# Patient Record
Sex: MALE | Race: ASIAN | Employment: STUDENT | URBAN - METROPOLITAN AREA
[De-identification: names, ages, dates, MRNs, and addresses within clinical notes are randomized per-mention and may not be internally consistent; named-entity substitution may affect disease eponyms.]

---

## 2018-08-17 ENCOUNTER — OFFICE VISIT (OUTPATIENT)
Dept: OCCUPATIONAL MEDICINE | Age: 23
End: 2018-08-17
Attending: PHYSICIAN ASSISTANT

## 2018-08-20 ENCOUNTER — OFFICE VISIT (OUTPATIENT)
Dept: OCCUPATIONAL MEDICINE | Age: 23
End: 2018-08-20
Attending: PHYSICIAN ASSISTANT

## 2018-08-24 ENCOUNTER — OFFICE VISIT (OUTPATIENT)
Dept: OCCUPATIONAL MEDICINE | Age: 23
End: 2018-08-24
Attending: PHYSICIAN ASSISTANT

## 2018-08-27 ENCOUNTER — OFFICE VISIT (OUTPATIENT)
Dept: OCCUPATIONAL MEDICINE | Age: 23
End: 2018-08-27
Attending: PHYSICIAN ASSISTANT

## 2021-08-04 NOTE — LETTER
Called patient and advised of below message from device RN and scheduling.    From Routing comments:     Your patient was recently seen at the [facility] for a hospital follow-up visit. The visit note is attached. Please contact the clinic with any questions at [phone].     Thank you,  FRANKLYN Castro

## 2022-01-05 ENCOUNTER — APPOINTMENT (OUTPATIENT)
Dept: CT IMAGING | Age: 27
DRG: 391 | End: 2022-01-05
Attending: PHYSICIAN ASSISTANT
Payer: MEDICAID

## 2022-01-05 ENCOUNTER — HOSPITAL ENCOUNTER (INPATIENT)
Facility: HOSPITAL | Age: 27
LOS: 3 days | Discharge: HOME | DRG: 391 | End: 2022-01-08
Attending: STUDENT IN AN ORGANIZED HEALTH CARE EDUCATION/TRAINING PROGRAM | Admitting: SURGERY
Payer: MEDICAID

## 2022-01-05 ENCOUNTER — HOSPITAL ENCOUNTER (OUTPATIENT)
Age: 27
Discharge: ANOTHER HOSPITAL | DRG: 391 | End: 2022-01-05
Payer: MEDICAID

## 2022-01-05 VITALS
BODY MASS INDEX: 22.53 KG/M2 | RESPIRATION RATE: 18 BRPM | DIASTOLIC BLOOD PRESSURE: 89 MMHG | WEIGHT: 132 LBS | TEMPERATURE: 99 F | HEART RATE: 103 BPM | HEIGHT: 64 IN | SYSTOLIC BLOOD PRESSURE: 136 MMHG | OXYGEN SATURATION: 99 %

## 2022-01-05 DIAGNOSIS — R10.32 ABDOMINAL PAIN, LEFT LOWER QUADRANT: ICD-10-CM

## 2022-01-05 DIAGNOSIS — K65.1 INTRA-ABDOMINAL ABSCESS (HCC): Primary | ICD-10-CM

## 2022-01-05 DIAGNOSIS — K57.20 DIVERTICULITIS OF LARGE INTESTINE WITH ABSCESS WITHOUT BLEEDING: Primary | ICD-10-CM

## 2022-01-05 LAB
#MXD IC: 0.9 X10ˆ3/UL (ref 0.1–1)
BUN BLD-MCNC: 14 MG/DL (ref 7–18)
CHLORIDE BLD-SCNC: 101 MMOL/L (ref 98–112)
CO2 BLD-SCNC: 31 MMOL/L (ref 21–32)
CREAT BLD-MCNC: 0.8 MG/DL
GLUCOSE BLD-MCNC: 99 MG/DL (ref 70–99)
HCT VFR BLD AUTO: 44.8 %
HCT VFR BLD CALC: 46 %
HGB BLD-MCNC: 15.4 G/DL
ISTAT IONIZED CALCIUM FOR CHEM 8: 1.24 MMOL/L (ref 1.12–1.32)
LYMPHOCYTES # BLD AUTO: 1.3 X10ˆ3/UL (ref 1–4)
LYMPHOCYTES NFR BLD AUTO: 11.2 %
MCH RBC QN AUTO: 30 PG (ref 26–34)
MCHC RBC AUTO-ENTMCNC: 34.4 G/DL (ref 31–37)
MCV RBC AUTO: 87.2 FL (ref 80–100)
MIXED CELL %: 7.5 %
NEUTROPHILS # BLD AUTO: 9.2 X10ˆ3/UL (ref 1.5–7.7)
NEUTROPHILS NFR BLD AUTO: 81.3 %
PLATELET # BLD AUTO: 300 X10ˆ3/UL (ref 150–450)
POCT BILIRUBIN URINE: NEGATIVE
POCT BLOOD URINE: NEGATIVE
POCT GLUCOSE URINE: NEGATIVE MG/DL
POCT KETONE URINE: NEGATIVE MG/DL
POCT LEUKOCYTE ESTERASE URINE: NEGATIVE
POCT NITRITE URINE: NEGATIVE
POCT PH URINE: 7 (ref 5–8)
POCT PROTEIN URINE: NEGATIVE MG/DL
POCT SPECIFIC GRAVITY URINE: 1.01
POCT URINE CLARITY: CLEAR
POCT URINE COLOR: YELLOW
POCT UROBILINOGEN URINE: 0.2 MG/DL
POTASSIUM BLD-SCNC: 4.5 MMOL/L (ref 3.6–5.1)
RBC # BLD AUTO: 5.14 X10ˆ6/UL
SODIUM BLD-SCNC: 138 MMOL/L (ref 136–145)
WBC # BLD AUTO: 11.4 X10ˆ3/UL (ref 4–11)

## 2022-01-05 PROCEDURE — 74177 CT ABD & PELVIS W/CONTRAST: CPT | Performed by: PHYSICIAN ASSISTANT

## 2022-01-05 PROCEDURE — 85025 COMPLETE CBC W/AUTO DIFF WBC: CPT | Performed by: PHYSICIAN ASSISTANT

## 2022-01-05 PROCEDURE — 99205 OFFICE O/P NEW HI 60 MIN: CPT

## 2022-01-05 PROCEDURE — 99222 1ST HOSP IP/OBS MODERATE 55: CPT | Performed by: SURGERY

## 2022-01-05 PROCEDURE — 81002 URINALYSIS NONAUTO W/O SCOPE: CPT | Performed by: PHYSICIAN ASSISTANT

## 2022-01-05 PROCEDURE — 80047 BASIC METABLC PNL IONIZED CA: CPT

## 2022-01-05 PROCEDURE — 96361 HYDRATE IV INFUSION ADD-ON: CPT

## 2022-01-05 RX ORDER — ACETAMINOPHEN 325 MG/1
650 TABLET ORAL EVERY 4 HOURS PRN
Status: DISCONTINUED | OUTPATIENT
Start: 2022-01-05 | End: 2022-01-08

## 2022-01-05 RX ORDER — HYDROCODONE BITARTRATE AND ACETAMINOPHEN 5; 325 MG/1; MG/1
1 TABLET ORAL EVERY 4 HOURS PRN
Status: DISCONTINUED | OUTPATIENT
Start: 2022-01-05 | End: 2022-01-08

## 2022-01-05 RX ORDER — HYDROMORPHONE HYDROCHLORIDE 1 MG/ML
0.2 INJECTION, SOLUTION INTRAMUSCULAR; INTRAVENOUS; SUBCUTANEOUS EVERY 2 HOUR PRN
Status: DISCONTINUED | OUTPATIENT
Start: 2022-01-05 | End: 2022-01-08

## 2022-01-05 RX ORDER — ENOXAPARIN SODIUM 100 MG/ML
40 INJECTION SUBCUTANEOUS EVERY 24 HOURS
Status: DISCONTINUED | OUTPATIENT
Start: 2022-01-05 | End: 2022-01-08

## 2022-01-05 RX ORDER — PROCHLORPERAZINE EDISYLATE 5 MG/ML
5 INJECTION INTRAMUSCULAR; INTRAVENOUS EVERY 8 HOURS PRN
Status: DISCONTINUED | OUTPATIENT
Start: 2022-01-05 | End: 2022-01-08

## 2022-01-05 RX ORDER — HYDROMORPHONE HYDROCHLORIDE 1 MG/ML
0.8 INJECTION, SOLUTION INTRAMUSCULAR; INTRAVENOUS; SUBCUTANEOUS EVERY 2 HOUR PRN
Status: DISCONTINUED | OUTPATIENT
Start: 2022-01-05 | End: 2022-01-08

## 2022-01-05 RX ORDER — ONDANSETRON 2 MG/ML
4 INJECTION INTRAMUSCULAR; INTRAVENOUS EVERY 6 HOURS PRN
Status: DISCONTINUED | OUTPATIENT
Start: 2022-01-05 | End: 2022-01-08

## 2022-01-05 RX ORDER — HYDROCODONE BITARTRATE AND ACETAMINOPHEN 5; 325 MG/1; MG/1
2 TABLET ORAL EVERY 4 HOURS PRN
Status: DISCONTINUED | OUTPATIENT
Start: 2022-01-05 | End: 2022-01-08

## 2022-01-05 RX ORDER — SODIUM CHLORIDE 9 MG/ML
1000 INJECTION, SOLUTION INTRAVENOUS ONCE
Status: COMPLETED | OUTPATIENT
Start: 2022-01-05 | End: 2022-01-05

## 2022-01-05 RX ORDER — KETOROLAC TROMETHAMINE 30 MG/ML
15 INJECTION, SOLUTION INTRAMUSCULAR; INTRAVENOUS ONCE
Status: COMPLETED | OUTPATIENT
Start: 2022-01-05 | End: 2022-01-05

## 2022-01-05 RX ORDER — HYDROMORPHONE HYDROCHLORIDE 1 MG/ML
0.4 INJECTION, SOLUTION INTRAMUSCULAR; INTRAVENOUS; SUBCUTANEOUS EVERY 2 HOUR PRN
Status: DISCONTINUED | OUTPATIENT
Start: 2022-01-05 | End: 2022-01-08

## 2022-01-05 RX ORDER — SODIUM CHLORIDE 9 MG/ML
INJECTION, SOLUTION INTRAVENOUS CONTINUOUS
Status: DISCONTINUED | OUTPATIENT
Start: 2022-01-05 | End: 2022-01-06

## 2022-01-05 NOTE — CONSULTS
[facility]  Report of  Surgical Consultation with History and Physical Exam    David Seats Patient Status:  Emergency    2/15/1995 MRN [id number]   Location [address] [facility] Attending Kristin Roldan MD   Hosp Day # 0 PCP Xin Albright medical conditions. His past surgical history is significant for testicular detorsion at age 21. He denies taking any blood thinning medications. The patient denies any family history of colon or rectal cancer.     The patient denies any history of toba weight 132 lb (59.9 kg), SpO2 97 %. General: Alert, orientated x3. Cooperative. No apparent distress. HEENT: Exam is unremarkable. Without scleral icterus. Mucous membranes are moist. EOM are WNL. Neck: No tenderness to palpitation.   Full rang collection is present centered on the wall of the distal descending colon in the left lower quadrant.  This is most compatible with an intramural abscess which is most likely related to diverticulitis.  Follow-up imaging/colonoscopy   is suggested to exclu

## 2022-01-05 NOTE — ED INITIAL ASSESSMENT (HPI)
Patient states he has had LLQ abdominal pain on and off since 12/27/21. Patient states he feels like he is constipated, last BM was last night after drinking prune juice.  Patient states the stool was all liquid and last solid stool was 2 days ago but very

## 2022-01-05 NOTE — ED PROVIDER NOTES
Patient Seen in: [facility] Emergency Department      History   Patient presents with:  Abdomen/Flank Pain    Stated Complaint: diverticulitis from IC    Subjective:   HPI    29yo M presents with abdominal pain.   Since 12/27 he has had fluctuating i normal.   Cardiovascular:      Rate and Rhythm: Normal rate and regular rhythm. Pulmonary:      Effort: Pulmonary effort is normal.      Breath sounds: Normal breath sounds. Abdominal:      Palpations: Abdomen is soft. Tenderness:  There is abdomin with abscess without bleeding K57.20 1/5/2022 Unknown

## 2022-01-05 NOTE — H&P
[facility]  Report of  Surgical Consultation with History and Physical Exam    Rip Castellon Patient Status:  Emergency    2/15/1995 MRN [id number]   Location 656 [facility] Attending Kaitlynn Hair MD   Hosp Day # 0 PCP Jimenez Albright intramural abscess. The patient denies any chronic medical conditions. His past surgical history is significant for testicular detorsion at age 21. He denies taking any blood thinning medications.     The patient denies any family history of colon or re °C), temperature source Oral, resp. rate 18, height 64\", weight 132 lb (59.9 kg), SpO2 97 %. General: Alert, orientated x3. Cooperative. No apparent distress. HEENT: Exam is unremarkable. Without scleral icterus.   Mucous membranes are moist. EOM normal-appearing appendix.            Impression   CONCLUSION:  A fluid collection is present centered on the wall of the distal descending colon in the left lower quadrant.  This is most compatible with an intramural abscess which is most likely related t physical examination, assessment and plan. I have I have edited the above to reflect my evaluation, opinion, and physical exam findings. Patient relates a history of Covid infection just after Clint.   He states he started having left-sided abdomi

## 2022-01-05 NOTE — ED PROVIDER NOTES
Patient Seen in: [facility]      History   Patient presents with:  Abdominal Pain    Stated Complaint: pain lower left side      Subjective:   HPI    61-year-old male here with complaint of left lower quadrant pain that is been intermitte He is well-developed. HENT:      Head: Normocephalic.       Right Ear: External ear normal.      Left Ear: External ear normal.      Nose: Nose normal.      Mouth/Throat:      Mouth: Mucous membranes are moist.   Eyes:      Extraocular Movements: Extraocu scanning was performed from the dome of the diaphragm to the pubic symphysis with non-ionic intravenous contrast material. Post contrast coronal MPR imaging was performed. Dose reduction techniques were used.  Dose information is transmitted to the ACR (Am colon in the left lower quadrant. This is most compatible with an intramural abscess which is most likely related to diverticulitis. Follow-up imaging/colonoscopy is suggested to exclude an underlying neoplastic process.     NOTE: patient is refusing ambu

## 2022-01-05 NOTE — ED QUICK NOTES
Orders for admission, patient is aware of plan and ready to go upstairs. Any questions, please call ED RN Kris Brown  at extension [phone]. Vaccinated?  yes  Type of COVID test sent: NA  COVID Suspicion level: Low (positive on 25 of december)      Titratable

## 2022-01-05 NOTE — ED INITIAL ASSESSMENT (HPI)
Sent to the ED after having CT that showed possible intramural abscess. Patient having LLQ abdominal pain since 12/27. Worse with movement. No fevers.  No n/v. covid + 12/25

## 2022-01-05 NOTE — ED QUICK NOTES
IV site wrapped for patient's drive to THE [facility]

## 2022-01-06 LAB
ANION GAP SERPL CALC-SCNC: 9 MMOL/L (ref 0–18)
BASOPHILS # BLD AUTO: 0.02 X10(3) UL (ref 0–0.2)
BASOPHILS NFR BLD AUTO: 0.2 %
BUN BLD-MCNC: 14 MG/DL (ref 7–18)
CALCIUM BLD-MCNC: 9.1 MG/DL (ref 8.5–10.1)
CHLORIDE SERPL-SCNC: 107 MMOL/L (ref 98–112)
CO2 SERPL-SCNC: 22 MMOL/L (ref 21–32)
CREAT BLD-MCNC: 0.75 MG/DL
EOSINOPHIL # BLD AUTO: 0.01 X10(3) UL (ref 0–0.7)
EOSINOPHIL NFR BLD AUTO: 0.1 %
ERYTHROCYTE [DISTWIDTH] IN BLOOD BY AUTOMATED COUNT: 10.7 %
GLUCOSE BLD-MCNC: 67 MG/DL (ref 70–99)
GLUCOSE BLD-MCNC: 72 MG/DL (ref 70–99)
GLUCOSE BLD-MCNC: 75 MG/DL (ref 70–99)
HCT VFR BLD AUTO: 40.3 %
HGB BLD-MCNC: 13.4 G/DL
IMM GRANULOCYTES # BLD AUTO: 0.06 X10(3) UL (ref 0–1)
IMM GRANULOCYTES NFR BLD: 0.5 %
LYMPHOCYTES # BLD AUTO: 1.05 X10(3) UL (ref 1–4)
LYMPHOCYTES NFR BLD AUTO: 9.3 %
MCH RBC QN AUTO: 28.9 PG (ref 26–34)
MCHC RBC AUTO-ENTMCNC: 33.3 G/DL (ref 31–37)
MCV RBC AUTO: 87 FL
MONOCYTES # BLD AUTO: 0.92 X10(3) UL (ref 0.1–1)
MONOCYTES NFR BLD AUTO: 8.2 %
NEUTROPHILS # BLD AUTO: 9.22 X10 (3) UL (ref 1.5–7.7)
NEUTROPHILS # BLD AUTO: 9.22 X10(3) UL (ref 1.5–7.7)
NEUTROPHILS NFR BLD AUTO: 81.7 %
OSMOLALITY SERPL CALC.SUM OF ELEC: 285 MOSM/KG (ref 275–295)
PLATELET # BLD AUTO: 279 10(3)UL (ref 150–450)
POTASSIUM SERPL-SCNC: 3.9 MMOL/L (ref 3.5–5.1)
RBC # BLD AUTO: 4.63 X10(6)UL
SODIUM SERPL-SCNC: 138 MMOL/L (ref 136–145)
WBC # BLD AUTO: 11.3 X10(3) UL (ref 4–11)

## 2022-01-06 PROCEDURE — 99232 SBSQ HOSP IP/OBS MODERATE 35: CPT | Performed by: SURGERY

## 2022-01-06 NOTE — PROGRESS NOTES
Mynor Webber who is covering for Dr.Mason womack d/t BG recheck being 76. Jduie Dodd notified that patient is currently NPO and has 0.9 NC running at 100 mL/hr.  MD ordered CLD for patient, to stop fluids if patient is tolerating diet and advance to FLD at that t

## 2022-01-06 NOTE — PROGRESS NOTES
[facility]  Progress Note    David Staley Patient Status:  Inpatient    2/15/1995 MRN [id number]   [facility] 3NE-A Attending Reagan Madera, DO   Hosp Day # 1 PCP Thai Madera MD     Subjective:  Patient seen and examined state large intestine with abscess without bleeding      Impression: Transmural colonic abscess clinically improving today. Continue IV antibiotics. Will get GI evaluation regarding possible consideration for inflammatory bowel disease.   CT scan does not show

## 2022-01-06 NOTE — PLAN OF CARE
NURSING ADMISSION NOTE      Patient admitted via Cart  Oriented to room. Safety precautions initiated. Bed in low position. Call light in reach.       Patient alert and oriented x4   No current c/o pain or SOB  Non tele, no edema present, peripheral puls

## 2022-01-06 NOTE — PLAN OF CARE
Pt alert/oriented x4 resting in bed. Vitals reviewed. Pt denies pain and SOB on RA sating at 97%. Pt NPO except for ice chips. Pt on IV ABT for diverticulitis abscess. Pt offers no complaints at this time.  Safety precautions in place, will continue to [facility]

## 2022-01-06 NOTE — PLAN OF CARE
Patient alert and oriented x4  No current c/o pain or SOB, abdominal guarding, previously c/o LLQ pain  No tele monitoring, no edema present, peripheral pulses palpable   Glucose checked this morning d/t serum glucose of 67 at 0519, glucometer BG 72 at [facility] facility with appropriate resources  Description: INTERVENTIONS:  - Identify barriers to discharge w/pt and caregiver  - Include patient/family/discharge partner in discharge planning  - Arrange for needed discharge resources and transportation as appropri

## 2022-01-06 NOTE — PAYOR COMM NOTE
--------------  ADMISSION REVIEW     Payor: [employer] #:  [id number]  Authorization Number: [id number]    Admit date: 1/5/22  Admit time:  6:42 PM       REVIEW DOCUMENTATION:        LLQ abdominal pain       SURGE torsion                Social History    Tobacco Use      Smoking status: Never Smoker      Smokeless tobacco: Never Used    Vaping Use      Vaping Use: Every day    Alcohol use: Yes    Drug use: Never             Review of Systems    Positive for stated c intramural abscess which is most likely related to diverticulitis. Follow-up imaging/colonoscopy is suggested to exclude an underlying neoplastic process.    Dictated by (CST): Ramez Velez MD on 1/05/2022 at 11:27 AM     Finalized by (CST): Ramez Velez MD on room physician for abdominal pain. History of Present Illness:  Dallas Castro is a a(n) 27year old male with a recent history of COVID-19 who presented to [facility] immediate care with complaints of 9 days of abdominal pain.     The patient reports juan history of alcohol abuse. History:  Past Medical History:   Diagnosis Date   • Testicular torsion 2012     Past Surgical History:   Procedure Laterality Date   • OTHER SURGICAL HISTORY  2012    testicular torsion     No family history on file.    repo lateral flexion of cervical spine. No JVD. Supple. Lungs: Clear to auscultation bilaterally. No wheezes, no rales, no rhonchi. Good excursion of the diaphragms. No secondary use of accessory respiratory musculature.     Cardiac: Regular rhythm, tachyca (CST):  Gabby Pizarro MD on 1/05/2022 at 11:27 AM       Finalized by (CST): Gabby Pizarro MD on 1/05/2022 at 11:34 AM         Impression:  Patient Active Problem List:     Diverticulitis of large intestine with abscess without bleeding    Naomy Villela is a 26-yea appetite has been poor he states his bowel movements have been irregular. He states he occasionally has a small bowel movement however he also will go days without a bowel movement. He states he does pass stool gas regularly.   He denies a history of joaquim (37.1 °C) — — — — — — — JM    01/06/22 0421 99 °F (37.2 °C) 78 18 121/63 97 % — — — IG    01/05/22 1813 98.2 °F (36.8 °C) — — — — — None (Room air) — JW    01/05/22 1813 — 97 14 153/80 99 % — — — AM    01/05/22 1800 — 98 13 115/56 99 % — None (Room air) —

## 2022-01-06 NOTE — CONSULTS
[facility]                       Gastroenterology Consultation-[facility]    Anibal Longoria Patient Status:  Inpatient    2/15/1995 MRN [id number]   [facility] 3NE-A Attending Keisha January, [address] Day Intravenous, Q6H PRN  prochlorperazine (COMPAZINE) injection 5 mg, 5 mg, Intravenous, Q8H PRN  Piperacillin Sod-Tazobactam So (ZOSYN) 3.375 g in dextrose 5% 100 mL IVPB-MBP, 3.375 g, Intravenous, Q8H  0.9% NaCl infusion, , Intravenous, Continuous      Ace lb (59.9 kg)   SpO2 97%   BMI 22.66 kg/m²   Gen: AAO x 3, able to speak in complete sentences  HENT: NCAT, EOMI, PERRL, oropharynx is clear with moist mucosal membranes  Eyes: Sclerae are anicteric  Neck:  Supple without nuchal rigidity;  No lymphadenopathy presumed abscess within wall of descending colon: could be secondary to colitis/diverticulitis in the surrounding area  2. Recent constipation with previously normal BMs  3. Recent COVID    Recommendations:   1. Continue Abx  2.  Surgery also following; sta

## 2022-01-07 LAB
ADENOVIRUS F 40/41 PCR: NEGATIVE
ASTROVIRUS PCR: NEGATIVE
C CAYETANENSIS DNA SPEC QL NAA+PROBE: NEGATIVE
C DIFF TOX B STL QL: NEGATIVE
CAMPY SP DNA.DIARRHEA STL QL NAA+PROBE: NEGATIVE
CRYPTOSP DNA SPEC QL NAA+PROBE: NEGATIVE
EAEC PAA PLAS AGGR+AATA ST NAA+NON-PRB: NEGATIVE
EC STX1+STX2 + H7 FLIC SPEC NAA+PROBE: NEGATIVE
ENTAMOEBA HISTOLYTICA PCR: NEGATIVE
EPEC EAE GENE STL QL NAA+NON-PROBE: NEGATIVE
ETEC LTA+ST1A+ST1B TOX ST NAA+NON-PROBE: NEGATIVE
GIARDIA LAMBLIA PCR: NEGATIVE
NOROVIRUS GI/GII PCR: NEGATIVE
P SHIGELLOIDES DNA STL QL NAA+PROBE: NEGATIVE
ROTAVIRUS A PCR: NEGATIVE
SALMONELLA DNA SPEC QL NAA+PROBE: NEGATIVE
SAPOVIRUS PCR: NEGATIVE
SHIGELLA SP+EIEC IPAH ST NAA+NON-PROBE: NEGATIVE
V CHOLERAE DNA SPEC QL NAA+PROBE: NEGATIVE
VIBRIO DNA SPEC NAA+PROBE: NEGATIVE
YERSINIA DNA SPEC NAA+PROBE: NEGATIVE

## 2022-01-07 PROCEDURE — 99232 SBSQ HOSP IP/OBS MODERATE 35: CPT | Performed by: SURGERY

## 2022-01-07 NOTE — PLAN OF CARE
Assumed care at [address] [facility]. Pt is A&O x4. On Ra, . No orders for tele. Denies any abdominal pain and n/v. Currently on full liquid diet. IV abx zosyn. Had 1 loose BM overnight, sent stool sample down to rule out c.diff per protocol.  On contact plus precaution

## 2022-01-07 NOTE — PROGRESS NOTES
Gastroenterology Progress Note  Patient Name: Julio Mello  Chief Complaint: abscess in colon, LLQ pain  S: Pt currently denies abdominal pain. He tolerated liquid diet. He notes 4 loose BMs yesterday.    O: /72 (BP Location: Right arm)   Pulse 58   T

## 2022-01-07 NOTE — PLAN OF CARE
Patient alert and oriented x4. On RA. Denies pain. Diarrhea. cdiff negative. IV abx. Resting comfortably in bed. WCTM.

## 2022-01-07 NOTE — PROGRESS NOTES
[facility]  Progress Note    Julio Mello Patient Status:  Inpatient    2/15/1995 MRN [id number]   [facility] 3NE-A Attending Gil Gilbert, DO   Hosp Day # 2 PCP Annabella Bernal MD     Subjective:  *Patient states he continues to fe

## 2022-01-07 NOTE — PAYOR COMM NOTE
--------------  CONTINUED STAY REVIEW    Payor: [employer] #:  [id number]  Authorization Number: [id number]    Admit date: 1/5/22  Admit time:  6:42 PM    Admitting Physician: Champ Thomas DO  Attending [facility]ici 17   Ht 5' 4\" (1.626 m)   Wt 132 lb (59.9 kg)   SpO2 98%   BMI 22.66 kg/m²   Gen: AAOx3  CV: RRR with normal S1 / S2  HEENT: sclerae anicteric  Abd: (+)BS, soft, non-tender, non-distended; no rebound or guarding  Ext: No edema or cyanosis  Skin: Warm and

## 2022-01-08 ENCOUNTER — APPOINTMENT (OUTPATIENT)
Dept: CT IMAGING | Facility: HOSPITAL | Age: 27
DRG: 391 | End: 2022-01-08
Attending: SURGERY
Payer: MEDICAID

## 2022-01-08 VITALS
SYSTOLIC BLOOD PRESSURE: 106 MMHG | BODY MASS INDEX: 22.36 KG/M2 | OXYGEN SATURATION: 98 % | DIASTOLIC BLOOD PRESSURE: 62 MMHG | HEIGHT: 64 IN | RESPIRATION RATE: 15 BRPM | TEMPERATURE: 99 F | WEIGHT: 131 LBS | HEART RATE: 61 BPM

## 2022-01-08 LAB — CREAT BLD-MCNC: 0.92 MG/DL

## 2022-01-08 PROCEDURE — 99232 SBSQ HOSP IP/OBS MODERATE 35: CPT | Performed by: SURGERY

## 2022-01-08 PROCEDURE — 74170 CT ABD WO CNTRST FLWD CNTRST: CPT | Performed by: SURGERY

## 2022-01-08 RX ORDER — AMOXICILLIN AND CLAVULANATE POTASSIUM 875; 125 MG/1; MG/1
1 TABLET, FILM COATED ORAL 2 TIMES DAILY
Qty: 20 TABLET | Refills: 0 | Status: SHIPPED | OUTPATIENT
Start: 2022-01-08 | End: 2022-01-18

## 2022-01-08 NOTE — PLAN OF CARE
Pt A&Ox4 Room Air  Resting in bed  No Tele order  Denies pain at this time  Meds given per Mar  IV Abx Zosyn (diverticulitis)  Plan for [address] 1/8 Contrast at the bedside. Plan to start at 0630 and complete other half 0730.    Safety precaution maintained patient needs post-hospital services based on physician/LIP order or complex needs related to functional status, cognitive ability or social support system  Outcome: Progressing

## 2022-01-08 NOTE — PROGRESS NOTES
Gastroenterology Progress Note  Patient Name: Roman Valentin  Chief Complaint: abscess in colon, LLQ pain  S: Pt currently denies abdominal pain. He tolerated liquid diet. He denies further diarrhea.    O: /62 (BP Location: Right arm)   Pulse 61   Temp

## 2022-01-08 NOTE — PROGRESS NOTES
NURSING DISCHARGE NOTE    Discharged Home via Wheelchair. Accompanied by Support staff  Belongings Taken by patient/family. IV removed. Explained discharge instructions to patient, no questions at this time. Belongings taken by patient.

## 2022-01-10 ENCOUNTER — PATIENT OUTREACH (OUTPATIENT)
Dept: CASE MANAGEMENT | Age: 27
End: 2022-01-10

## 2022-01-10 DIAGNOSIS — K57.20 DIVERTICULITIS OF LARGE INTESTINE WITH ABSCESS WITHOUT BLEEDING: ICD-10-CM

## 2022-01-10 NOTE — PROGRESS NOTES
ROBROBBY for post hospital follow up. [facility] contact information provided as well as [facility] office number, [phone].

## 2022-01-10 NOTE — PROGRESS NOTES
Patient called requesting assistance scheduling apts     [facility]  [address] [facility] [address] [phone]  Apt made for Wed. Jan. 12th @[facility]    [address]   [facility]  [address]  [facility]  [facility] [address]  [facility]

## 2022-01-11 NOTE — PROGRESS NOTES
Initial Post Discharge Follow Up   Discharge Date: 1/8/22  Contact Date: 1/11/2022    Consent Verification:  Assessment Completed With: Patient  HIPAA Verified?   Yes    Discharge Dx:      Diverticulitis of large intestine with abscess without bleeding yes  o If so, were these medication changes discussed with you prior to leaving the hospital? yes  • (NCM) If a new medication was prescribed:    o Was the new medication’s purpose & side effects reviewed?  yes  o Do you have any questions about your new [facility]  [facility] [address] [place] [id number] [phone]          PCP TCM/HFU appointment: scheduled at D/C within 7-14 days  yes     NCM Reviewed/scheduled/rescheduled PCP TCM/HFU appointm

## 2022-01-12 ENCOUNTER — OFFICE VISIT (OUTPATIENT)
Dept: INTERNAL MEDICINE CLINIC | Facility: CLINIC | Age: 27
End: 2022-01-12
Payer: MEDICAID

## 2022-01-12 VITALS
TEMPERATURE: 97 F | HEART RATE: 81 BPM | SYSTOLIC BLOOD PRESSURE: 110 MMHG | OXYGEN SATURATION: 97 % | BODY MASS INDEX: 22.53 KG/M2 | DIASTOLIC BLOOD PRESSURE: 82 MMHG | WEIGHT: 132 LBS | RESPIRATION RATE: 16 BRPM | HEIGHT: 64 IN

## 2022-01-12 DIAGNOSIS — K57.20 DIVERTICULITIS OF LARGE INTESTINE WITH ABSCESS WITHOUT BLEEDING: Primary | ICD-10-CM

## 2022-01-12 DIAGNOSIS — K65.1 INTRA-ABDOMINAL ABSCESS (HCC): ICD-10-CM

## 2022-01-12 PROCEDURE — 99203 OFFICE O/P NEW LOW 30 MIN: CPT | Performed by: NURSE PRACTITIONER

## 2022-01-12 PROCEDURE — 3079F DIAST BP 80-89 MM HG: CPT | Performed by: NURSE PRACTITIONER

## 2022-01-12 PROCEDURE — 3074F SYST BP LT 130 MM HG: CPT | Performed by: NURSE PRACTITIONER

## 2022-01-12 PROCEDURE — 3008F BODY MASS INDEX DOCD: CPT | Performed by: NURSE PRACTITIONER

## 2022-01-12 NOTE — PATIENT INSTRUCTIONS
Discharge Instructions:    · Call us to help schedule your appointment once you have found stomach doctor  · Go on health care insurance website and look for gastroenterologist (stomach doctor) in area:  · [address] [place] [address] is not in your network  · Look for:  Nikko

## 2022-01-12 NOTE — PROGRESS NOTES
[facility]ARE CLINIC PHARMACIST MEDICATION RECONCILIATION        Roman Valentin MRN [id number]    2/15/1995 PCP Deepika Bailey MD       Comments: Medication history completed by the [facility] Pharmacist with patient in the office.

## 2022-01-12 NOTE — PROGRESS NOTES
[address]      HISTORY   CHIEF COMPLAINT: HFU-Diverticulitis with intra-abdominal abscess.      HPI: Mabry Sona is a 27year old male here today for hospital follow up for Diverticulitis with intra-abdominal abs abscess along the distal descending colon wall, possibly due to acute diverticulitis. There is fatty induration and thickening surrounding this region extending into the left pericolic gutter. The largest portion of the fluid collection measures up to 1. conjunctivae pink  NECK: supple  CHEST: no chest tenderness  LUNGS: clear to auscultation bilaterally, no wheezes, rhonchi or rales, normal respiratory effort  CARDIO: S1, S2, RRR without audible murmur  GI: + BS to all quadrants, no tenderness  MUSCULOSKE tests and need for follow-up on treatments  ? specialists already aware of assumption/resumption of care  ? Education given to patient on self-management, independent living, and activities of daily living. ?  Referrals as listed below in orders Assisted i

## 2022-01-19 ENCOUNTER — OFFICE VISIT (OUTPATIENT)
Dept: SURGERY | Facility: CLINIC | Age: 27
End: 2022-01-19
Payer: MEDICAID

## 2022-01-19 VITALS — TEMPERATURE: 97 F

## 2022-01-19 DIAGNOSIS — K63.0: Primary | ICD-10-CM

## 2022-01-19 PROCEDURE — 99214 OFFICE O/P EST MOD 30 MIN: CPT | Performed by: SURGERY

## 2022-01-19 NOTE — H&P
New Patient Visit Note       Active Problems      No diagnosis found. Chief Complaint   Patient presents with:  Diverticulitis: new patient seen in ER for diverticulitis.  denies pain currently, finished antibiotics yesterday, denies nausea or vomiting o Review of Systems has been reviewed by me during today. Review of Systems   Constitutional: Negative for chills, diaphoresis, fatigue, fever and unexpected weight change. HENT: Negative for hearing loss, nosebleeds, sore throat and trouble swallowing. Assessment   Resolved transmural abscess of the descending colon, clinically. I feel this is unlikely diverticulitis despite the CT scan report. It was reviewed by me with Dr. Celeste Tolentino who identifies no diverticuli in the patient's colon.   I think infl

## 2022-01-20 PROBLEM — K65.1 INTRA-ABDOMINAL ABSCESS (HCC): Status: ACTIVE | Noted: 2022-01-20

## 2022-02-02 NOTE — DISCHARGE SUMMARY
[facility]  Discharge Summary    Naomy Villela Patient Status:  Inpatient    2/15/1995 MRN [id number]   [facility] 3NE-A Attending No att. providers found   Hosp Day # 3 PCP Laine Munoz MD     Date of Admission: 2022    Date of stable. White blood cell count slightly elevated at 7.4. CT scan of the patient's abdomen and pelvis shows a fluid collection arising from the distal descending colon measuring approximately 3.0 x 2.5 cm with associated colonic wall thickening.   Findings

## 2022-02-16 ENCOUNTER — HOSPITAL ENCOUNTER (EMERGENCY)
Facility: HOSPITAL | Age: 27
Discharge: HOME | End: 2022-02-16
Attending: EMERGENCY MEDICINE
Payer: MEDICAID

## 2022-02-16 VITALS
DIASTOLIC BLOOD PRESSURE: 84 MMHG | HEIGHT: 64 IN | HEART RATE: 87 BPM | RESPIRATION RATE: 16 BRPM | OXYGEN SATURATION: 100 % | WEIGHT: 132 LBS | TEMPERATURE: 99 F | BODY MASS INDEX: 22.53 KG/M2 | SYSTOLIC BLOOD PRESSURE: 124 MMHG

## 2022-02-16 DIAGNOSIS — R19.7 NAUSEA VOMITING AND DIARRHEA: Primary | ICD-10-CM

## 2022-02-16 DIAGNOSIS — R11.2 NAUSEA VOMITING AND DIARRHEA: Primary | ICD-10-CM

## 2022-02-16 LAB
ALBUMIN SERPL-MCNC: 4.4 G/DL (ref 3.4–5)
ALBUMIN/GLOB SERPL: 1.2 {RATIO} (ref 1–2)
ALP LIVER SERPL-CCNC: 94 U/L
ALT SERPL-CCNC: 33 U/L
ANION GAP SERPL CALC-SCNC: 8 MMOL/L (ref 0–18)
AST SERPL-CCNC: 22 U/L (ref 15–37)
BASOPHILS # BLD AUTO: 0.02 X10(3) UL (ref 0–0.2)
BASOPHILS NFR BLD AUTO: 0.2 %
BUN BLD-MCNC: 15 MG/DL (ref 7–18)
C DIFF TOX B STL QL: NEGATIVE
CALCIUM BLD-MCNC: 9.3 MG/DL (ref 8.5–10.1)
CHLORIDE SERPL-SCNC: 104 MMOL/L (ref 98–112)
CO2 SERPL-SCNC: 28 MMOL/L (ref 21–32)
CREAT BLD-MCNC: 0.89 MG/DL
EOSINOPHIL # BLD AUTO: 0 X10(3) UL (ref 0–0.7)
EOSINOPHIL NFR BLD AUTO: 0 %
ERYTHROCYTE [DISTWIDTH] IN BLOOD BY AUTOMATED COUNT: 11.8 %
GLOBULIN PLAS-MCNC: 3.7 G/DL (ref 2.8–4.4)
GLUCOSE BLD-MCNC: 96 MG/DL (ref 70–99)
HCT VFR BLD AUTO: 48.3 %
HGB BLD-MCNC: 16.3 G/DL
IMM GRANULOCYTES # BLD AUTO: 0.02 X10(3) UL (ref 0–1)
IMM GRANULOCYTES NFR BLD: 0.2 %
LIPASE SERPL-CCNC: 57 U/L (ref 73–393)
LYMPHOCYTES # BLD AUTO: 0.42 X10(3) UL (ref 1–4)
LYMPHOCYTES NFR BLD AUTO: 4.7 %
MCH RBC QN AUTO: 28.8 PG (ref 26–34)
MCV RBC AUTO: 85.5 FL
MONOCYTES # BLD AUTO: 0.41 X10(3) UL (ref 0.1–1)
MONOCYTES NFR BLD AUTO: 4.6 %
NEUTROPHILS # BLD AUTO: 8.06 X10 (3) UL (ref 1.5–7.7)
NEUTROPHILS NFR BLD AUTO: 90.3 %
OSMOLALITY SERPL CALC.SUM OF ELEC: 291 MOSM/KG (ref 275–295)
PLATELET # BLD AUTO: 252 10(3)UL (ref 150–450)
POTASSIUM SERPL-SCNC: 3.6 MMOL/L (ref 3.5–5.1)
PROT SERPL-MCNC: 8.1 G/DL (ref 6.4–8.2)
RBC # BLD AUTO: 5.65 X10(6)UL
SARS-COV-2 RNA RESP QL NAA+PROBE: NOT DETECTED
SODIUM SERPL-SCNC: 140 MMOL/L (ref 136–145)
WBC # BLD AUTO: 8.9 X10(3) UL (ref 4–11)

## 2022-02-16 PROCEDURE — 87493 C DIFF AMPLIFIED PROBE: CPT | Performed by: EMERGENCY MEDICINE

## 2022-02-16 PROCEDURE — 82272 OCCULT BLD FECES 1-3 TESTS: CPT | Performed by: EMERGENCY MEDICINE

## 2022-02-16 PROCEDURE — 83690 ASSAY OF LIPASE: CPT | Performed by: EMERGENCY MEDICINE

## 2022-02-16 PROCEDURE — 99284 EMERGENCY DEPT VISIT MOD MDM: CPT

## 2022-02-16 PROCEDURE — 87045 FECES CULTURE AEROBIC BACT: CPT | Performed by: EMERGENCY MEDICINE

## 2022-02-16 PROCEDURE — 80053 COMPREHEN METABOLIC PANEL: CPT | Performed by: EMERGENCY MEDICINE

## 2022-02-16 PROCEDURE — 96374 THER/PROPH/DIAG INJ IV PUSH: CPT

## 2022-02-16 PROCEDURE — 85025 COMPLETE CBC W/AUTO DIFF WBC: CPT | Performed by: EMERGENCY MEDICINE

## 2022-02-16 PROCEDURE — 87427 SHIGA-LIKE TOXIN AG IA: CPT | Performed by: EMERGENCY MEDICINE

## 2022-02-16 PROCEDURE — 87046 STOOL CULTR AEROBIC BACT EA: CPT | Performed by: EMERGENCY MEDICINE

## 2022-02-16 PROCEDURE — 96361 HYDRATE IV INFUSION ADD-ON: CPT

## 2022-02-16 RX ORDER — ONDANSETRON 2 MG/ML
4 INJECTION INTRAMUSCULAR; INTRAVENOUS ONCE
Status: COMPLETED | OUTPATIENT
Start: 2022-02-16 | End: 2022-02-16

## 2022-02-16 RX ORDER — ONDANSETRON 4 MG/1
4 TABLET, ORALLY DISINTEGRATING ORAL EVERY 4 HOURS PRN
Qty: 10 TABLET | Refills: 0 | Status: SHIPPED | OUTPATIENT
Start: 2022-02-16 | End: 2022-02-23

## 2022-02-16 NOTE — ED INITIAL ASSESSMENT (HPI)
Intermittent mid/ upper abd pain today and with nausea and vomiting x3 today, diarrhea about 10 today. Deny fever. Took pepto bismol without improvement. Recent abscess on colon in January.

## 2022-02-16 NOTE — ED QUICK NOTES
Pt awake and alert, skin w/d,resps appear unlabored. Pt denies pain at this time but states he is having intermittent cramping.

## 2022-02-16 NOTE — ED QUICK NOTES
Pt to restroom with steady gait for stool specimen and urine sample. Pt states he is feeling better and no pain.

## 2022-03-19 ENCOUNTER — OFFICE VISIT (OUTPATIENT)
Dept: OCCUPATIONAL MEDICINE | Age: 27
End: 2022-03-19
Attending: PREVENTIVE MEDICINE

## 2022-03-21 ENCOUNTER — OFFICE VISIT (OUTPATIENT)
Dept: OCCUPATIONAL MEDICINE | Age: 27
End: 2022-03-21
Attending: PHYSICIAN ASSISTANT

## 2022-03-21 DIAGNOSIS — Z11.1 SCREENING-PULMONARY TB: Primary | ICD-10-CM

## 2022-03-21 PROCEDURE — 86480 TB TEST CELL IMMUN MEASURE: CPT

## 2022-03-23 LAB
M TB IFN-G CD4+ T-CELLS BLD-ACNC: 0.11 IU/ML
M TB TUBERC IFN-G BLD QL: NEGATIVE
M TB TUBERC IGNF/MITOGEN IGNF CONTROL: >10 IU/ML
QFT TB1 AG MINUS NIL: 0.01 IU/ML
QFT TB2 AG MINUS NIL: -0.01 IU/ML

## 2022-11-08 ENCOUNTER — OFFICE VISIT (OUTPATIENT)
Dept: FAMILY MEDICINE CLINIC | Facility: CLINIC | Age: 27
End: 2022-11-08
Payer: MEDICAID

## 2022-11-08 VITALS
RESPIRATION RATE: 18 BRPM | WEIGHT: 135.13 LBS | HEIGHT: 64 IN | TEMPERATURE: 98 F | OXYGEN SATURATION: 98 % | SYSTOLIC BLOOD PRESSURE: 136 MMHG | HEART RATE: 92 BPM | DIASTOLIC BLOOD PRESSURE: 94 MMHG | BODY MASS INDEX: 23.07 KG/M2

## 2022-11-08 DIAGNOSIS — R21 RASH AND NONSPECIFIC SKIN ERUPTION: Primary | ICD-10-CM

## 2022-11-08 DIAGNOSIS — R03.0 ELEVATED BLOOD PRESSURE READING: ICD-10-CM

## 2022-11-08 PROCEDURE — 99213 OFFICE O/P EST LOW 20 MIN: CPT | Performed by: PHYSICIAN ASSISTANT

## 2022-11-08 PROCEDURE — 3080F DIAST BP >= 90 MM HG: CPT | Performed by: PHYSICIAN ASSISTANT

## 2022-11-08 PROCEDURE — 3008F BODY MASS INDEX DOCD: CPT | Performed by: PHYSICIAN ASSISTANT

## 2022-11-08 PROCEDURE — 3075F SYST BP GE 130 - 139MM HG: CPT | Performed by: PHYSICIAN ASSISTANT

## 2022-11-08 RX ORDER — PREDNISONE 20 MG/1
20 TABLET ORAL 2 TIMES DAILY
Qty: 10 TABLET | Refills: 0 | Status: SHIPPED | OUTPATIENT
Start: 2022-11-08 | End: 2022-11-13

## 2022-11-08 NOTE — PATIENT INSTRUCTIONS
Start steroid. Take with food. No other ibuprofen/aleve OTC with medication   Oral antihistamines for itch. Claritin in the am. Benadryl at night   Do not scratch   Blood pressure elevated in office. Monitor at home.  Close PCP follow up   Please follow up with PCP if no improvement or if symptoms worsen

## 2022-11-16 ENCOUNTER — OCC HEALTH (OUTPATIENT)
Dept: OCCUPATIONAL MEDICINE | Age: 27
End: 2022-11-16
Attending: PHYSICIAN ASSISTANT

## 2022-11-16 DIAGNOSIS — S61.012A: Primary | ICD-10-CM

## 2024-06-24 ENCOUNTER — TELEPHONE (OUTPATIENT)
Facility: LOCATION | Age: 29
End: 2024-06-24

## 2024-06-24 DIAGNOSIS — K57.92 DIVERTICULITIS: Primary | ICD-10-CM

## 2024-06-24 NOTE — TELEPHONE ENCOUNTER
The patient's wife called our office earlier today stating he was having symptoms similar to a previous diverticulitis attack.  His most recent attack was approximately 2 years ago.  He followed up with Dr. Casper following this attack.    I called the patient this evening to discuss his symptoms.  He states approximately 3 days ago, he began having upper abdominal left-sided pain.  This was associated with a fever and chills.  He had a Tmax of 100.92 nights ago.  He denies nausea or vomiting.    I we will order a stat CT scan of the abdomen and pelvis.  I provided the patient with the central scheduling phone number to call and schedule the CT scan.    If he has any new or worsening symptoms, he should proceed to the emergency department immediately.    I informed the patient that we will provide him with antibiotics as needed based on his CT scan results.    He expressed understanding with the above plan.  He has a follow-up visit scheduled with Dr. Casper.

## 2024-06-24 NOTE — TELEPHONE ENCOUNTER
Patient's wife calling because the patient is experiencing a diverticulitis flare up. Says that he's been experiencing abdominal pain, bloating, fever at 100.9 three days ago but it's normal now.   Two years ago he had the same feeling and ended up going to the emergency room.   They want to know what the best course of action is for him.     Please advise  Best callback number is [phone]    Future Appointments   Date Time Provider Department Center   8/13/2024 11:30 AM Maco Casper DO [id number]

## 2024-08-15 NOTE — PROGRESS NOTES
[facility]  Progress Note    Rip Castellon Patient Status:  Inpatient    2/15/1995 MRN [id number]   [facility] 3NE-A Attending Wallace Briscoe, DO   Hosp Day # 3 PCP Kellen Perkins MD     Subjective:  No new complaints.  He denies abdom have made any relevant changes in editing her note. I agree with the above listed assessment and plan and again have modified the report to reflect my opinion. Patient seen and examined. States he feels much better.   States he has no pain when he ge Clofazimine Counseling:  I discussed with the patient the risks of clofazimine including but not limited to skin and eye pigmentation, liver damage, nausea/vomiting, gastrointestinal bleeding and allergy.

## (undated) NOTE — Clinical Note
Pt has HFU appt today. Pt reports he is doing good. Medication list reviewed. Pt has an appt with Surgery and is awaiting a CB from GI, he may need a different GI if this one does not accept his insurance. Thank you.